# Patient Record
Sex: MALE | Race: WHITE | NOT HISPANIC OR LATINO | URBAN - METROPOLITAN AREA
[De-identification: names, ages, dates, MRNs, and addresses within clinical notes are randomized per-mention and may not be internally consistent; named-entity substitution may affect disease eponyms.]

---

## 2022-05-23 ENCOUNTER — OFFICE VISIT (OUTPATIENT)
Dept: URBAN - METROPOLITAN AREA CLINIC 63 | Facility: CLINIC | Age: 67
End: 2022-05-23

## 2022-07-09 ENCOUNTER — TELEPHONE ENCOUNTER (OUTPATIENT)
Dept: URBAN - METROPOLITAN AREA CLINIC 121 | Facility: CLINIC | Age: 67
End: 2022-07-09

## 2022-07-09 RX ORDER — ESOMEPRAZOLE MAGNESIUM 40 MG/1
GRANULE, DELAYED RELEASE ORAL ONCE A DAY
Refills: 0 | OUTPATIENT
Start: 2019-08-16 | End: 2019-09-13

## 2022-07-09 RX ORDER — CIPROFLOXACIN HCL 500 MG
BID TABLET ORAL
Refills: 0 | OUTPATIENT
Start: 2016-12-13 | End: 2016-12-28

## 2022-07-09 RX ORDER — ESOMEPRAZOLE MAGNESIUM 40 MG/1
GRANULE, DELAYED RELEASE ORAL ONCE A DAY
Refills: 0 | OUTPATIENT
Start: 2019-09-13 | End: 2019-09-13

## 2022-07-10 ENCOUNTER — TELEPHONE ENCOUNTER (OUTPATIENT)
Dept: URBAN - METROPOLITAN AREA CLINIC 121 | Facility: CLINIC | Age: 67
End: 2022-07-10

## 2022-07-10 RX ORDER — PANCRELIPASE 24000; 76000; 120000 [USP'U]/1; [USP'U]/1; [USP'U]/1
TAKE ONE TABLET WITH MEAL TID CAPSULE, DELAYED RELEASE PELLETS ORAL
Refills: 0 | Status: ACTIVE | COMMUNITY
Start: 2016-12-28

## 2024-04-25 PROBLEM — 197321007: Status: ACTIVE | Noted: 2024-04-25

## 2024-04-29 ENCOUNTER — LAB (OUTPATIENT)
Dept: URBAN - METROPOLITAN AREA CLINIC 60 | Facility: CLINIC | Age: 69
End: 2024-04-29

## 2024-04-29 ENCOUNTER — OV NP (OUTPATIENT)
Dept: URBAN - METROPOLITAN AREA CLINIC 60 | Facility: CLINIC | Age: 69
End: 2024-04-29
Payer: MEDICARE

## 2024-04-29 VITALS
HEIGHT: 72 IN | HEART RATE: 75 BPM | SYSTOLIC BLOOD PRESSURE: 130 MMHG | DIASTOLIC BLOOD PRESSURE: 80 MMHG | OXYGEN SATURATION: 95 % | BODY MASS INDEX: 27.9 KG/M2 | TEMPERATURE: 98.3 F | WEIGHT: 206 LBS

## 2024-04-29 DIAGNOSIS — R19.4 CHANGE IN BOWEL HABITS: ICD-10-CM

## 2024-04-29 DIAGNOSIS — K22.0 ACHALASIA: ICD-10-CM

## 2024-04-29 DIAGNOSIS — K44.9 LARGE HIATAL HERNIA: ICD-10-CM

## 2024-04-29 DIAGNOSIS — K76.0 FATTY LIVER: ICD-10-CM

## 2024-04-29 PROCEDURE — 99214 OFFICE O/P EST MOD 30 MIN: CPT | Performed by: PHYSICIAN ASSISTANT

## 2024-04-29 RX ORDER — ONDANSETRON HYDROCHLORIDE 4 MG/1
1 TABLET TABLET, FILM COATED ORAL
Qty: 2 | Refills: 0 | OUTPATIENT
Start: 2024-04-29

## 2024-04-29 RX ORDER — POLYETHYLENE GLYCOL 3350, SODIUM CHLORIDE, SODIUM BICARBONATE, POTASSIUM CHLORIDE 420; 11.2; 5.72; 1.48 G/4L; G/4L; G/4L; G/4L
AS DIRECTED POWDER, FOR SOLUTION ORAL ONCE
Qty: 4000 | Refills: 0 | OUTPATIENT
Start: 2024-04-29 | End: 2024-04-30

## 2024-04-29 RX ORDER — LIDOCAINE 50 MG/G
1 APPLICATION AS NEEDED CREAM TOPICAL
Qty: 1 TUBE | Refills: 1 | OUTPATIENT
Start: 2024-04-29 | End: 2024-06-28

## 2024-04-29 NOTE — HPI-TODAY'S VISIT:
Connor is a pleasant 68-year-old male who presents today for evaluation of change in bowel habits.  History of achalasia previously referred to [facility] swallowing clinic.  Evaluation for possible POEM versus laparoscopic Heller myotomy.  EGD August 2019.  Patulous lower esophageal sphincter.  Moderate resistance to endoscope advancement into the stomach.  Irregular Z-line.  Dilation was performed with a Savary dilator with no resistance at 60 Greek.  Gastritis.  Normal duodenum.  Focal mild active chronic gastritis.  Negative H. pylori.  Lower third esophageal biopsy showed mild esophagitis with scattered eosinophilic infiltrate, favor reflux induced.  Negative for intestinal metaplasia.  Positive for scattered budding spores.  Oral contamination or superimposed Candida esophagitis is in the differential diagnosis.  Manometry dated October 2019 showed absent contractility by Linden classification method.  Study is very similar to achalasia except for LES not maintaining constant high pressures.  Colonoscopy in April 2016 showed pandiverticulosis coli.  Internal hemorrhoids.  No specimens collected.  Placed into a 10-year recall.  He notes rectal pruritis since Jan 2024. Last year he started noticing issues with constipation. Over the last couple of months he has noted improvement. Denies nausea, vomiting, heartburn, reflux, dysphagia, odynophagia, hematemesis, coffee ground emesis, abdominal pain, abnormal weight loss, BRBPR or melena. Denies family history of colon cancer or colon polyps. No other GI complaints at this time

## 2024-05-06 ENCOUNTER — LAB OUTSIDE AN ENCOUNTER (OUTPATIENT)
Dept: URBAN - METROPOLITAN AREA CLINIC 60 | Facility: CLINIC | Age: 69
End: 2024-05-06

## 2024-05-20 ENCOUNTER — LAB OUTSIDE AN ENCOUNTER (OUTPATIENT)
Dept: URBAN - METROPOLITAN AREA CLINIC 63 | Facility: CLINIC | Age: 69
End: 2024-05-20

## 2024-06-04 ENCOUNTER — TELEPHONE ENCOUNTER (OUTPATIENT)
Dept: URBAN - METROPOLITAN AREA CLINIC 63 | Facility: CLINIC | Age: 69
End: 2024-06-04

## 2024-06-27 ENCOUNTER — TELEPHONE ENCOUNTER (OUTPATIENT)
Dept: URBAN - METROPOLITAN AREA CLINIC 60 | Facility: CLINIC | Age: 69
End: 2024-06-27

## 2024-07-01 ENCOUNTER — OUT OF OFFICE VISIT (OUTPATIENT)
Dept: URBAN - METROPOLITAN AREA SURGERY CENTER 4 | Facility: SURGERY CENTER | Age: 69
End: 2024-07-01
Payer: MEDICARE

## 2024-07-01 DIAGNOSIS — Z12.11 ENCOUNTER FOR SCREENING FOR MALIGNANT NEOPLASM OF COLON: ICD-10-CM

## 2024-07-01 DIAGNOSIS — K57.30 DIVERTICULOSIS OF LARGE INTESTINE WITHOUT PERFORATION OR ABSCESS WITHOUT BLEEDING: ICD-10-CM

## 2024-07-01 DIAGNOSIS — Z12.11 COLON CANCER SCREENING: ICD-10-CM

## 2024-07-01 DIAGNOSIS — K64.0 FIRST DEGREE HEMORRHOIDS: ICD-10-CM

## 2024-07-01 PROCEDURE — G0121 COLON CA SCRN NOT HI RSK IND: HCPCS | Performed by: INTERNAL MEDICINE

## 2024-07-01 PROCEDURE — 00812 ANES LWR INTST SCR COLSC: CPT | Performed by: NURSE ANESTHETIST, CERTIFIED REGISTERED

## 2024-07-01 RX ORDER — ONDANSETRON HYDROCHLORIDE 4 MG/1
1 TABLET TABLET, FILM COATED ORAL
Qty: 2 | Refills: 0 | Status: ACTIVE | COMMUNITY
Start: 2024-04-29

## 2024-07-23 ENCOUNTER — DASHBOARD ENCOUNTERS (OUTPATIENT)
Age: 69
End: 2024-07-23

## 2024-07-23 ENCOUNTER — OFFICE VISIT (OUTPATIENT)
Dept: URBAN - METROPOLITAN AREA CLINIC 60 | Facility: CLINIC | Age: 69
End: 2024-07-23
Payer: MEDICARE

## 2024-07-23 VITALS
SYSTOLIC BLOOD PRESSURE: 126 MMHG | WEIGHT: 198 LBS | HEIGHT: 72 IN | DIASTOLIC BLOOD PRESSURE: 78 MMHG | BODY MASS INDEX: 26.82 KG/M2 | OXYGEN SATURATION: 96 % | HEART RATE: 60 BPM | TEMPERATURE: 98.3 F

## 2024-07-23 DIAGNOSIS — K82.8 CYST OF GALLBLADDER: ICD-10-CM

## 2024-07-23 DIAGNOSIS — K76.0 HEPATIC STEATOSIS: ICD-10-CM

## 2024-07-23 DIAGNOSIS — K57.90 DIVERTICULOSIS: ICD-10-CM

## 2024-07-23 DIAGNOSIS — K59.09 CHRONIC CONSTIPATION: ICD-10-CM

## 2024-07-23 PROBLEM — 397881000: Status: ACTIVE | Noted: 2024-07-23

## 2024-07-23 PROBLEM — 197321007: Status: ACTIVE | Noted: 2024-07-23

## 2024-07-23 PROCEDURE — 99214 OFFICE O/P EST MOD 30 MIN: CPT

## 2024-07-23 RX ORDER — LINACLOTIDE 145 UG/1
1 CAPSULE AT LEAST 30 MINUTES BEFORE THE FIRST MEAL OF THE DAY ON AN EMPTY STOMACH CAPSULE, GELATIN COATED ORAL ONCE A DAY
Qty: 30 | Refills: 1 | OUTPATIENT
Start: 2024-07-23 | End: 2024-09-21

## 2024-07-23 NOTE — HPI-PREVIOUS PROCEDURES
Colonoscopy 7/1/2024 with Dr. Disla - Nonbleeding internal hemorrhoids. - Moderate diverticulosis in the entire examined colon.  No evidence of diverticular bleeding. - No specimens collected. - Repeat colonoscopy in 10 years for surveillance.  EGD August 2019. Patulous lower esophageal sphincter. Moderate resistance to endoscope advancement into the stomach. Irregular Z-line. Dilation was performed with a Savary dilator with no resistance at 60 Slovenian. Gastritis. Normal duodenum. Focal mild active chronic gastritis. Negative H. pylori. Lower third esophageal biopsy showed mild esophagitis with scattered eosinophilic infiltrate, favor reflux induced. Negative for intestinal metaplasia. Positive for scattered budding spores. Oral contamination or superimposed Candida esophagitis is in the differential diagnosis.  Manometry dated October 2019 showed absent contractility by Madeline classification method. Study is very similar to achalasia except for LES not maintaining constant high pressures.  Colonoscopy in April 2016 showed pandiverticulosis coli. Internal hemorrhoids. No specimens collected. Placed into a 10-year recall. No prior Colonoscopy or EGD.

## 2024-07-23 NOTE — HPI-PREVIOUS IMAGING
5/20/2024 right upper quadrant ultrasound - Gallbladder polyp measuring 0.5 cm.  Follow-up ultrasound in 6 months recommended. - Findings of hepatic steatosis.    5/20/2024 FibroScan - No steatosis, S0. No evidence of clinically significant fibrosis, F0. No previous GI imaging to review.

## 2024-07-23 NOTE — HPI-TODAY'S VISIT:
Connor is a pleasant 68-year-old male who presents today for follow-up after colonoscopy.  History of achalasia previously referred to [facility] swallowing clinic.  Evaluation for possible POEM versus laparoscopic Heller myotomy. Previously noted that patient declined referral to [facility]/surgical evaluation evaluation despite history of achalasia and large hiatal hernia.   Since the end of 2022, he started noticing issues with constipation. He reports atleast 64 oz of water intake daily. He reports having bowel movements every 2-3 days. His last bowel movement was 4 days ago. He denies abdominal pain, nausea, vomiting. He describes his stools as being hard and diffficult to pass. He does strain while using the bathroom. His rectal itching has resolved since the last visit. He currently takes Metamucil daily. He tried Miralax 1 capful with no efficacy.  Denies nausea, vomiting, heartburn, reflux, dysphagia, odynophagia, hematemesis, coffee ground emesis, abdominal pain, abnormal weight loss, BRBPR or melena. No other GI complaints at this time.

## 2024-07-23 NOTE — HPI-PREVIOUS LABS
Labs 5/9/2024 - Hepatitis B surface antibody immunity less than 5. - Nonreactive hepatitis A IgM - Nonreactive hep B core antibody IgM. - CMP within normal limits. - Hep B core antibody total nonreactive. - Total hep a antibody nonreactive. - Nonreactive hepatitis B surface antigen - Nonreactive hepatitis C antibody.

## 2024-08-20 ENCOUNTER — OFFICE VISIT (OUTPATIENT)
Dept: URBAN - METROPOLITAN AREA CLINIC 60 | Facility: CLINIC | Age: 69
End: 2024-08-20
Payer: MEDICARE

## 2024-08-20 ENCOUNTER — LAB OUTSIDE AN ENCOUNTER (OUTPATIENT)
Dept: URBAN - METROPOLITAN AREA CLINIC 60 | Facility: CLINIC | Age: 69
End: 2024-08-20

## 2024-08-20 ENCOUNTER — TELEPHONE ENCOUNTER (OUTPATIENT)
Dept: URBAN - METROPOLITAN AREA CLINIC 60 | Facility: CLINIC | Age: 69
End: 2024-08-20

## 2024-08-20 VITALS
OXYGEN SATURATION: 97 % | HEIGHT: 72 IN | HEART RATE: 69 BPM | SYSTOLIC BLOOD PRESSURE: 118 MMHG | WEIGHT: 199.8 LBS | BODY MASS INDEX: 27.06 KG/M2 | DIASTOLIC BLOOD PRESSURE: 78 MMHG | TEMPERATURE: 98.7 F

## 2024-08-20 DIAGNOSIS — K22.0 ACHALASIA: ICD-10-CM

## 2024-08-20 DIAGNOSIS — K82.8 CYST OF GALLBLADDER: ICD-10-CM

## 2024-08-20 DIAGNOSIS — K59.09 CHRONIC CONSTIPATION: ICD-10-CM

## 2024-08-20 DIAGNOSIS — K76.0 HEPATIC STEATOSIS: ICD-10-CM

## 2024-08-20 PROCEDURE — 99214 OFFICE O/P EST MOD 30 MIN: CPT

## 2024-08-20 RX ORDER — LINACLOTIDE 145 UG/1
1 CAPSULE AT LEAST 30 MINUTES BEFORE THE FIRST MEAL OF THE DAY ON AN EMPTY STOMACH CAPSULE, GELATIN COATED ORAL ONCE A DAY
Qty: 30 | Refills: 1 | Status: ON HOLD | COMMUNITY
Start: 2024-07-23 | End: 2024-09-21

## 2024-08-20 NOTE — HPI-TODAY'S VISIT:
This is a pleasant 68-year-old male who presents today for follow-up of chronic constipation and complaint of dysphagia.  History of achalasia, TIA, prostate cancer. Surgical history significant for prostatectomy, tonsillectomy.  Today, he reports constipation has improved since starting Linzess. He reports unremarkable bowel movements. He does report ongoing intermittent episodes of dysphagia. Reports dysphagia with solids and liquids. When dysphagia is prominent, he will put himself on a soft food diet. Denies aspirating events.  He does have a history of achalasia. He was previously referred to [facility] swallowing clinic for evaluation for possible POEM versus laparoscopic Heller myotomy. Previously noted that patient declined referral to [facility]/surgical evaluation despite history of achalasia and large hiatal hernia. Last EGD with dilation done in August 2019.  Currently has no additional GI complaints, questions, concerns. Denies melena, hematochezia, bright red blood per rectum, nausea/vomiting, hematemesis, reflux, abdominal pain, constipation/diarrhea/change in bowels, change in stool caliber, unintentional weight loss. Denies a history of stroke, heart attack, pacemaker, defibrillator, stents, blood thinners, COPD, asthma, sleep apnea, chronic kidney disease, diabetes, seizures.

## 2024-08-20 NOTE — HPI-PREVIOUS PROCEDURES
Colonoscopy 7/1/2024 with Dr. Disla - Nonbleeding internal hemorrhoids. - Moderate diverticulosis in the entire examined colon.  No evidence of diverticular bleeding. - No specimens collected. - Repeat colonoscopy in 10 years for surveillance.  EGD August 2019. Patulous lower esophageal sphincter. Moderate resistance to endoscope advancement into the stomach. Irregular Z-line. Dilation was performed with a Savary dilator with no resistance at 60 Albanian. Gastritis. Normal duodenum. Focal mild active chronic gastritis. Negative H. pylori. Lower third esophageal biopsy showed mild esophagitis with scattered eosinophilic infiltrate, favor reflux induced. Negative for intestinal metaplasia. Positive for scattered budding spores. Oral contamination or superimposed Candida esophagitis is in the differential diagnosis.  Manometry dated October 2019 showed absent contractility by Hale classification method. Study is very similar to achalasia except for LES not maintaining constant high pressures.  Colonoscopy in April 2016 showed pandiverticulosis coli. Internal hemorrhoids. No specimens collected. Placed into a 10-year recall. No prior Colonoscopy or EGD.

## 2024-12-02 ENCOUNTER — LAB OUTSIDE AN ENCOUNTER (OUTPATIENT)
Dept: URBAN - METROPOLITAN AREA CLINIC 60 | Facility: CLINIC | Age: 69
End: 2024-12-02